# Patient Record
Sex: MALE | Race: WHITE | NOT HISPANIC OR LATINO
[De-identification: names, ages, dates, MRNs, and addresses within clinical notes are randomized per-mention and may not be internally consistent; named-entity substitution may affect disease eponyms.]

---

## 2019-07-25 PROBLEM — Z00.00 ENCOUNTER FOR PREVENTIVE HEALTH EXAMINATION: Status: ACTIVE | Noted: 2019-07-25

## 2019-07-30 ENCOUNTER — APPOINTMENT (OUTPATIENT)
Dept: INTERNAL MEDICINE | Facility: CLINIC | Age: 69
End: 2019-07-30
Payer: COMMERCIAL

## 2019-07-30 VITALS
HEART RATE: 85 BPM | BODY MASS INDEX: 33.86 KG/M2 | SYSTOLIC BLOOD PRESSURE: 138 MMHG | DIASTOLIC BLOOD PRESSURE: 87 MMHG | WEIGHT: 184 LBS | HEIGHT: 62 IN

## 2019-07-30 DIAGNOSIS — M25.50 PAIN IN UNSPECIFIED JOINT: ICD-10-CM

## 2019-07-30 DIAGNOSIS — R19.8 OTHER SPECIFIED SYMPTOMS AND SIGNS INVOLVING THE DIGESTIVE SYSTEM AND ABDOMEN: ICD-10-CM

## 2019-07-30 RX ORDER — OLMESARTAN MEDOXOMIL 5 MG/1
5 TABLET, FILM COATED ORAL
Refills: 0 | Status: ACTIVE | COMMUNITY

## 2019-07-30 RX ORDER — TAMSULOSIN HYDROCHLORIDE 0.4 MG/1
0.4 CAPSULE ORAL
Refills: 0 | Status: ACTIVE | COMMUNITY

## 2019-07-30 NOTE — HISTORY OF PRESENT ILLNESS
[FreeTextEntry1] : Initial Medical OV [de-identified] : generally healthy 68-year-old, under treatment for hypertension. Details of the actual sequence of events is not clear. However, starting sometimein the summer of 2018,possibly related to an illness or the antibiotic treatment for it, he notedchangesin his urinary and bowel pattern.\par The urinary symptoms are predominantly a slow stream, some daytime frequency, no dysuria, and a new nocturia over 7 hours. On awakening, the urge to void is strong but the ability to past. The urine is compromise. He saw a urologist, was told of an enlarged prostate, and started tamsulosin 2 days ago.\par his bowel pattern changed from his usual one formed stool. Each morning. Now he has an urge to defecate, but only a small amount of stool passes. Various a sense of incomplete evacuation. Typically, hehas a similar bout in the evening. There's been no bleeding, abdominal p or alteration in his dietarypattern.

## 2019-07-30 NOTE — PHYSICAL EXAM
[Well-Appearing] : well-appearing [Normal Sclera/Conjunctiva] : normal sclera/conjunctiva [No Respiratory Distress] : no respiratory distress  [Clear to Auscultation] : lungs were clear to auscultation bilaterally [Normal Rate] : normal rate  [Regular Rhythm] : with a regular rhythm [No Murmur] : no murmur heard [No Carotid Bruits] : no carotid bruits [No Edema] : there was no peripheral edema [Soft] : abdomen soft [Non Tender] : non-tender [No HSM] : no HSM [Normal Bowel Sounds] : normal bowel sounds [No Spinal Tenderness] : no spinal tenderness [Grossly Normal Strength/Tone] : grossly normal strength/tone [No Skin Lesions] : no skin lesions [No Focal Deficits] : no focal deficits [Normal Gait] : normal gait [Normal Affect] : the affect was normal [Normal Insight/Judgement] : insight and judgment were intact [Kyphosis] : no kyphosis [de-identified] : No adenopathy; thyroid not palpable. [Scoliosis] : no scoliosis [FreeTextEntry1] : just done by urologist - enlarged prostate [de-identified] : no bladder distention

## 2020-07-01 ENCOUNTER — APPOINTMENT (OUTPATIENT)
Dept: INTERNAL MEDICINE | Facility: CLINIC | Age: 70
End: 2020-07-01
Payer: COMMERCIAL

## 2020-07-01 VITALS
HEART RATE: 80 BPM | SYSTOLIC BLOOD PRESSURE: 155 MMHG | BODY MASS INDEX: 35.15 KG/M2 | WEIGHT: 191 LBS | HEIGHT: 62 IN | DIASTOLIC BLOOD PRESSURE: 93 MMHG

## 2020-07-01 VITALS — SYSTOLIC BLOOD PRESSURE: 151 MMHG | DIASTOLIC BLOOD PRESSURE: 95 MMHG | HEART RATE: 79 BPM

## 2020-07-01 DIAGNOSIS — E88.81 METABOLIC SYNDROME: ICD-10-CM

## 2020-07-01 DIAGNOSIS — E78.2 MIXED HYPERLIPIDEMIA: ICD-10-CM

## 2020-07-01 DIAGNOSIS — N40.1 BENIGN PROSTATIC HYPERPLASIA WITH LOWER URINARY TRACT SYMPMS: ICD-10-CM

## 2020-07-01 DIAGNOSIS — M54.9 DORSALGIA, UNSPECIFIED: ICD-10-CM

## 2020-07-01 DIAGNOSIS — I10 ESSENTIAL (PRIMARY) HYPERTENSION: ICD-10-CM

## 2020-07-02 PROBLEM — E78.2 MIXED HYPERLIPIDEMIA: Status: ACTIVE | Noted: 2020-07-02

## 2020-07-02 PROBLEM — M54.9 BACK PAIN, SUBACUTE: Status: ACTIVE | Noted: 2020-07-02

## 2020-07-02 PROBLEM — N40.1 ENLARGED PROSTATE WITH LOWER URINARY TRACT SYMPTOMS (LUTS): Status: ACTIVE | Noted: 2019-07-30

## 2020-07-02 PROBLEM — E88.81 METABOLIC SYNDROME: Status: ACTIVE | Noted: 2020-07-02

## 2020-07-02 RX ORDER — ATORVASTATIN CALCIUM 10 MG/1
10 TABLET, FILM COATED ORAL
Refills: 0 | Status: ACTIVE | COMMUNITY

## 2020-07-02 NOTE — ASSESSMENT
[FreeTextEntry1] : Many issues already under other MD care. Patient understands situation - insight is there but denial or psychology interferes with improvement. Tried during lengthy visit to reorient him to the bigger picture.

## 2020-07-02 NOTE — PHYSICAL EXAM
[No Acute Distress] : no acute distress [No Respiratory Distress] : no respiratory distress  [Normal Sclera/Conjunctiva] : normal sclera/conjunctiva [Clear to Auscultation] : lungs were clear to auscultation bilaterally [Normal Rate] : normal rate  [Regular Rhythm] : with a regular rhythm [No Edema] : there was no peripheral edema [No Skin Lesions] : no skin lesions [Grossly Normal Strength/Tone] : grossly normal strength/tone [Normal Gait] : normal gait [No Focal Deficits] : no focal deficits [de-identified] : prominent central obesity habitus [de-identified] : No adenopathy; thyroid not palpable. [de-identified] : no chest wall tenderness [de-identified] : tense obesity w/o tenderness; limits full inspiration; no HSM although exam compormised

## 2020-07-02 NOTE — HISTORY OF PRESENT ILLNESS
[FreeTextEntry1] : Concerns about recent CXR, labs, and sxs from back and urinary [de-identified] : HIs presentatio of issues is tangential but ultimately hx and findings come down to:\par 1. Major central obesity affecting his breathing and back\par 2. Unclear possible resp infection episode - see active problems\par 3. AM stiffnes getting out of bed iproves with time\par 4. sofetimes cold numbness in feet\par 5. BPH sxs - has  f/u in 1 month\par 6. had hemorrhoidal sxs - checked and treated by MD\par Other conditions detailed below. Sense all have been addressed by other MD and is primarily seeking my second opinion on lab and xray data presented today

## 2020-07-02 NOTE — PLAN
[FreeTextEntry1] : 1. Mediation compliance and adjustment advised.\par 2. Focus on diet and activity to improve self\par 3. f/u with other MDs involved in his care\par

## 2020-07-28 ENCOUNTER — APPOINTMENT (OUTPATIENT)
Dept: DISASTER EMERGENCY | Facility: CLINIC | Age: 70
End: 2020-07-28

## 2020-07-29 LAB — SARS-COV-2 N GENE NPH QL NAA+PROBE: NOT DETECTED

## 2020-07-31 ENCOUNTER — APPOINTMENT (OUTPATIENT)
Dept: PULMONOLOGY | Facility: CLINIC | Age: 70
End: 2020-07-31
Payer: COMMERCIAL

## 2020-07-31 VITALS
HEART RATE: 83 BPM | DIASTOLIC BLOOD PRESSURE: 82 MMHG | WEIGHT: 190 LBS | SYSTOLIC BLOOD PRESSURE: 130 MMHG | HEIGHT: 62 IN | RESPIRATION RATE: 14 BRPM | OXYGEN SATURATION: 96 % | BODY MASS INDEX: 34.96 KG/M2

## 2020-07-31 DIAGNOSIS — R06.00 DYSPNEA, UNSPECIFIED: ICD-10-CM

## 2020-07-31 DIAGNOSIS — J18.9 PNEUMONIA, UNSPECIFIED ORGANISM: ICD-10-CM

## 2020-07-31 DIAGNOSIS — Z99.89 OBSTRUCTIVE SLEEP APNEA (ADULT) (PEDIATRIC): ICD-10-CM

## 2020-07-31 DIAGNOSIS — G47.33 OBSTRUCTIVE SLEEP APNEA (ADULT) (PEDIATRIC): ICD-10-CM

## 2020-07-31 RX ORDER — ALBUTEROL SULFATE 90 UG/1
108 (90 BASE) INHALANT RESPIRATORY (INHALATION)
Qty: 1 | Refills: 3 | Status: ACTIVE | COMMUNITY
Start: 2020-07-31 | End: 1900-01-01

## 2020-07-31 NOTE — PHYSICAL EXAM
[No Acute Distress] : no acute distress [No Neck Mass] : no neck mass [Normal Appearance] : normal appearance [Normal Oropharynx] : normal oropharynx [Normal Rate/Rhythm] : normal rate/rhythm [Normal S1, S2] : normal s1, s2 [No Resp Distress] : no resp distress [No Murmurs] : no murmurs [Clear to Auscultation Bilaterally] : clear to auscultation bilaterally [Benign] : benign [No Abnormalities] : no abnormalities [Normal Gait] : normal gait [No Clubbing] : no clubbing [No Cyanosis] : no cyanosis [No Edema] : no edema [FROM] : FROM [Normal Color/ Pigmentation] : normal color/ pigmentation [No Focal Deficits] : no focal deficits [Oriented x3] : oriented x3 [Normal Affect] : normal affect

## 2020-08-01 NOTE — PROCEDURE
[FreeTextEntry1] : Chest x-ray normal.  Previously noted minimal infiltrate at left base resolved.\par DFTs demonstrate normal spirometry and increased lung volumes

## 2020-08-01 NOTE — HISTORY OF PRESENT ILLNESS
[Former] : former [< 30 pack-years] : < 30 pack-years [Never] : never [TextBox_4] : Patient here for evaluation of episode of shortness of breath that occurred several months ago he had an episode of bad shortness of breath back in January he was diagnosed with pneumonia and treated with antibiotics.  He has been feeling better but still has intermittent episodes of shortness of breath these occur both with rest and with exertion.  Denies chest pain.  History of sleep apnea currently on CPAP [TextBox_13] : 10 [YearQuit] : 2016

## 2020-08-01 NOTE — ASSESSMENT
[FreeTextEntry1] : Minimal pneumonia seen on initial chest x-ray does not appear to be adequate explanation for etiology of dyspnea.  PFTs do not show airway obstruction but demonstrate increase in lung volumes suspect patient had asthma attack.  Recommend PRN bronchodilator and reassessment during acute illness.\par \par Does have a history of sleep apnea offered patient assessment he should bring in his sleep studies and CPAP machine.

## 2021-10-06 PROBLEM — I10 ESSENTIAL HYPERTENSION: Status: ACTIVE | Noted: 2019-07-30
